# Patient Record
Sex: MALE | Race: ASIAN | NOT HISPANIC OR LATINO | URBAN - METROPOLITAN AREA
[De-identification: names, ages, dates, MRNs, and addresses within clinical notes are randomized per-mention and may not be internally consistent; named-entity substitution may affect disease eponyms.]

---

## 2023-10-25 ENCOUNTER — EMERGENCY (EMERGENCY)
Facility: HOSPITAL | Age: 33
LOS: 1 days | Discharge: HOME | End: 2023-10-25
Admitting: STUDENT IN AN ORGANIZED HEALTH CARE EDUCATION/TRAINING PROGRAM
Payer: MEDICAID

## 2023-10-25 VITALS
SYSTOLIC BLOOD PRESSURE: 134 MMHG | OXYGEN SATURATION: 99 % | RESPIRATION RATE: 19 BRPM | DIASTOLIC BLOOD PRESSURE: 80 MMHG | TEMPERATURE: 98 F | HEART RATE: 93 BPM

## 2023-10-25 VITALS — OXYGEN SATURATION: 98 % | TEMPERATURE: 99 F

## 2023-10-25 PROCEDURE — 72100 X-RAY EXAM L-S SPINE 2/3 VWS: CPT | Mod: 26

## 2023-10-25 PROCEDURE — 99284 EMERGENCY DEPT VISIT MOD MDM: CPT

## 2023-10-25 PROCEDURE — 93010 ELECTROCARDIOGRAM REPORT: CPT

## 2023-10-25 RX ORDER — ACETAMINOPHEN 500 MG
975 TABLET ORAL DAILY
Refills: 0 | Status: DISCONTINUED | OUTPATIENT
Start: 2023-10-25 | End: 2023-10-29

## 2023-10-25 RX ORDER — DIAZEPAM 5 MG
1 TABLET ORAL
Qty: 10 | Refills: 0
Start: 2023-10-25

## 2023-10-25 RX ORDER — DIAZEPAM 5 MG
5 TABLET ORAL ONCE
Refills: 0 | Status: DISCONTINUED | OUTPATIENT
Start: 2023-10-25 | End: 2023-10-25

## 2023-10-25 RX ORDER — IBUPROFEN 200 MG
1 TABLET ORAL
Qty: 20 | Refills: 0
Start: 2023-10-25

## 2023-10-25 RX ORDER — IBUPROFEN 200 MG
600 TABLET ORAL ONCE
Refills: 0 | Status: COMPLETED | OUTPATIENT
Start: 2023-10-25 | End: 2023-10-25

## 2023-10-25 RX ADMIN — Medication 600 MILLIGRAM(S): at 13:00

## 2023-10-25 RX ADMIN — Medication 975 MILLIGRAM(S): at 13:00

## 2023-10-25 RX ADMIN — Medication 5 MILLIGRAM(S): at 13:00

## 2023-10-25 NOTE — ED ADULT NURSE NOTE - OBJECTIVE STATEMENT
Pt is alert and orientedx4, ambulatory at baseline. Pt presents to the ED with lower back pain, days he was lifting boxes yesterday and now its painful. Pts says he has been dealing with back pain. Pt denies chest pain, shortness of breath, dyspnea on exertion, breathing is unlabored and even. Pt denies nausea, vomiting or diarrhea.

## 2023-10-25 NOTE — ED ADULT NURSE NOTE - NSFALLUNIVINTERV_ED_ALL_ED
Bed/Stretcher in lowest position, wheels locked, appropriate side rails in place/Call bell, personal items and telephone in reach/Instruct patient to call for assistance before getting out of bed/chair/stretcher/Non-slip footwear applied when patient is off stretcher/[place] to call system/Physically safe environment - no spills, clutter or unnecessary equipment/Purposeful proactive rounding/Room/bathroom lighting operational, light cord in reach

## 2023-10-25 NOTE — ED ADULT TRIAGE NOTE - CHIEF COMPLAINT QUOTE
Pt complaining of back pain x2 years. Pt states 2 days ago he 2was lifting something and when he bent over the pain became worse. Pt denies chest pain, sob, n/v/d, fever or chills.

## 2023-10-25 NOTE — ED PROVIDER NOTE - OBJECTIVE STATEMENT
33-year-old male history of chronic back pain presents ED complaining of low back pain worsening over the past 2 days.  Patient states 2 weeks ago slipped and fell on his back while walking down the steps.  States since has been having intermittent back pain however 2 days ago when he was trying to lift something off the ground he felt severe low back spasming.  Patient has been taking naproxen without much improvement.  Currently pain 9/10.  Did not take any pain meds today.  Pain is localized.  Patient states 2 years ago saw spine specialist and was told he has disc herniation L5-S1 patient was offered spinal injections at that time which patient deferred since pain improved.  Denies any urinary symptoms, CP, SOB, abdominal pain, numbness, weakness, bowel or bladder incontinence.

## 2023-10-25 NOTE — ED PROVIDER NOTE - PATIENT PORTAL LINK FT
You can access the FollowMyHealth Patient Portal offered by [facility] by registering at the following website: [email or web address]. By joining [facility]’s FollowMyHealth portal, you will also be able to view your health information using other applications (apps) compatible with our system.

## 2023-10-25 NOTE — ED PROVIDER NOTE - INTERNATIONAL TRAVEL
Writer attempted to call for RN to RN report. JESSICA Villar states \"I told you guys a few minutes ago that I cannot take the patient right now. I have had 4 walk ins and I am not done going through his paperwork and now I have another walk in at this moment. I will call back in a couple minutes when I am ready\".    No
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
## 2023-10-25 NOTE — ED PROVIDER NOTE - NS ED ROS FT
ROS:  GENERAL: No fever, no chills  EYES: no change in vision  HEENT: no trouble swallowing, no trouble speaking  CARDIAC: no chest pain  PULMONARY: no cough, no shortness of breath  GI: no abdominal pain, no nausea, no vomiting, no diarrhea, no constipation  : No dysuria, no frequency, no change in appearance, or odor of urine  SKIN: no rashes  NEURO: no headache, no weakness  MSK: +BACK PAIN. No joint pain

## 2023-10-26 PROBLEM — Z00.00 ENCOUNTER FOR PREVENTIVE HEALTH EXAMINATION: Status: ACTIVE | Noted: 2023-10-26

## 2023-11-01 ENCOUNTER — APPOINTMENT (OUTPATIENT)
Dept: ORTHOPEDIC SURGERY | Facility: CLINIC | Age: 33
End: 2023-11-01

## 2024-08-05 NOTE — ED PROVIDER NOTE - CLINICAL SUMMARY MEDICAL DECISION MAKING FREE TEXT BOX
"Ariana Bill is a 69 y.o. male     Subjective   This patient presents today for follow-up of his calf claudication bilaterally with ambulation.  He states that he has to stop when his calf pain gets significant.  He states that he has to rest for about 30 minutes and then is legs feel better.  He used to smoke a pack a day but now he is smoking a few cigarettes a week.  He is on aspirin and statin.  He is currently not a diabetic.  He is a retired .  He denies any fever chills nausea vomiting or headache.  He is 5 foot 8 and does weigh 207 pounds.  He currently denies any history of back surgery or persistent significant back pain.         Objective     Vitals:    08/01/24 0900   BP: 150/70   Pulse: 89   SpO2: 98%      Physical Exam  This patient is alert and oriented x 3.  He is in no acute distress.  Head is normocephalic.  Pupils are equal and reactive to light and accommodation.  Neck is soft and supple without palpable lymph nodes.  Heart is regular rate.  Lungs have some decreased breath sounds posteriorly.  Abdomen is soft with positive bowel sounds there is no pain on palpation.  Upper and lower extremities seem to have adequate range of motion at rest.  He does have palpable brachial radial femoral popliteal and pedal pulses bilaterally.  His right pedal pulse is greater than his left.  Skin turgor is adequate.  Psychologically seems to be acting appropriately  Blood pressure 150/70, pulse 89, height 1.727 m (5' 8\"), weight 93.9 kg (207 lb), SpO2 98%.            Patient Active Problem List    Diagnosis Date Noted    Coronary artery disease involving native coronary artery of native heart without angina pectoris 04/12/2024    Claudication (CMS-HCC) 04/12/2024    Right wrist pain 04/12/2024    Lesion of right cervical nerve root 04/12/2024    Carpal tunnel syndrome of right wrist 04/12/2024    Effusion of right knee 04/11/2024    Arthritis of knee 04/11/2024    Elevated coronary artery " calcium score 04/03/2024    Hypertension 01/11/2024    Hyperlipidemia 12/19/2023    GERD (gastroesophageal reflux disease) 12/19/2023    Colon polyp 12/19/2023    Smoker 12/19/2023    Subacute cough 12/19/2023    Benign prostatic hyperplasia 12/19/2023    Vitamin D deficiency 12/19/2023    Hodgkin's disease (Multi) 02/28/2005          Current Outpatient Medications:     amLODIPine (Norvasc) 10 mg tablet, Take 1 tablet (10 mg) by mouth once daily in the morning. Take before meals., Disp: , Rfl:     aspirin 81 mg EC tablet, Take 1 tablet (81 mg) by mouth once daily., Disp: , Rfl:     atorvastatin (Lipitor) 40 mg tablet, Take 1 tablet (40 mg) by mouth once daily., Disp: 90 tablet, Rfl: 3    cholecalciferol (Vitamin D-3) 50 mcg (2,000 unit) capsule, Take 1 capsule (50 mcg) by mouth once daily., Disp: , Rfl:     cyanocobalamin (Vitamin B-12) 1,000 mcg tablet, Take 1 tablet (1,000 mcg) by mouth once daily., Disp: , Rfl:     hydroCHLOROthiazide (HYDRODiuril) 25 mg tablet, Take 1 tablet (25 mg) by mouth once daily in the morning. Take before meals., Disp: , Rfl:     losartan (Cozaar) 50 mg tablet, Take 1 tablet (50 mg) by mouth once daily., Disp: , Rfl:     naproxen (Naprosyn) 500 mg tablet, Take 1 tablet (500 mg) by mouth 2 times daily (morning and late afternoon)., Disp: , Rfl:     pantoprazole (ProtoNix) 20 mg EC tablet, Take 1 tablet (20 mg) by mouth once daily., Disp: , Rfl:      Lab Results   Component Value Date    WBC 8.8 12/19/2023    HGB 16.3 12/19/2023    HCT 47.8 12/19/2023     12/19/2023    CHOL 261 (H) 12/19/2023    TRIG 265 (H) 12/19/2023    HDL 50.8 12/19/2023    ALT 37 12/19/2023    AST 25 12/19/2023     12/19/2023    K 5.1 01/17/2024     12/19/2023    CREATININE 1.07 12/19/2023    BUN 14 12/19/2023    CO2 28 12/19/2023    TSH 0.94 12/19/2023    PSA 2.5 12/19/2023    HGBA1C 5.8 (H) 12/19/2023    HGBA1C 5.8 (H) 12/19/2023       BIANCA with exercise    Result Date: 7/11/2024                  [facility] [address], [address], [place] [address]       Tel [phone] and Fax [phone]  Vascular Lab Report [facility] ANKLE BRACHIAL INDEX (BIANCA) WITH EXERCISE  Patient Name:      ZORAN GUAJARDO    [place]iana Physician: [id number] Henny Faust MD Study Date:        7/10/2024          Ordering           [id number] CINDI DELGADO                                       Physician: MRN/PID:           [id number]           Technologist:      Yudi Rowe T Accession#:        [id number]       Technologist 2: Date of Birth/Age: 1955 / 69      Encounter#:        [id number]                    years Gender:            M Admission Status:  Outpatient         Location           [facility]                                       Performed:  Diagnosis/ICD: Peripheral vascular disease, unspecified-I73.9 Indication:    Peripheral vascular disease CPT Codes:     23234.52 Peripheral artery PVR with exercise Reduced Service  CONCLUSIONS: Right Lower PVR: No evidence of arterial occlusive disease in the right lower extremity at rest and with exercise. Multiphasic flow is noted in the right dorsalis pedis artery and right posterior tibial artery. Left Lower PVR: No evidence of arterial occlusive disease in the left lower extremity at rest and with exercise. Multiphasic flow is noted in the left posterior tibial artery and left dorsalis pedis artery. Exercise:Exercise completed at 1 1/2 miles per hour for 3 minutes and 30 seconds. The exam was terminated due to extremity pain.  Imaging & Doppler Findings:  RIGHT Lower PVR                Pressures Ratios Right Posterior Tibial (Ankle) 166 mmHg  1.08 Right Dorsalis Pedis (Ankle)   166 mmHg  1.08  Right Ankle Post Exercise      194 mmHg  1.18  LEFT Lower PVR                Pressures Ratios Left Posterior Tibial (Ankle) 174 mmHg  1.13 Left Dorsalis Pedis (Ankle)   173 mmHg  1.12  Left Ankle Post Exercise       192 mmHg  1.17                    Right     Left Brachial Pressure 154 mmHg 149 mmHg   [id number] Henny Faust MD Electronically signed by [id number] Henny Faust MD on 7/11/2024 at 4:08:40 PM  ** Final **     Vascular US ankle brachial index (BIANCA) with exercise    Result Date: 7/11/2024                [facility] [address], [address], [place], [place] [address]       Tel [phone] and Fax [phone] Vascular Lab Report [facility] US ANKLE BRACHIAL INDEX (BIANCA) WITH EXERCISE Patient Name:      ZORAN GUAJARDO    Reading Physician: [id number] Henny Faust MD Study Date:        7/10/2024          Ordering           [id number] CINDI DELGADO                                       Physician: MRN/PID:           [id number]           Technologist:      Yudi Rowe HALEY Accession#:        [id number]       Technologist 2: Date of Birth/Age: 1955 / 69      Encounter#:        [id number]                    years Gender:            M Admission Status:  Outpatient         Location           [facility]                                       Performed: Diagnosis/ICD: Peripheral vascular disease, unspecified-I73.9 Indication:    Peripheral vascular disease CPT Codes:     46814.52 Peripheral artery PVR with exercise Reduced Service CONCLUSIONS: Right Lower PVR: No evidence of arterial occlusive disease in the right lower extremity at rest and with exercise. Multiphasic flow is noted in the right dorsalis pedis artery and right posterior tibial artery. Left Lower PVR: No evidence of arterial occlusive disease in the left lower extremity at rest and with exercise. Multiphasic flow is noted in the left posterior tibial artery and left dorsalis pedis artery. Exercise:Exercise completed at 1 1/2 miles per hour for 3 minutes and 30 seconds. The exam was terminated due to extremity pain. Imaging & Doppler Findings: RIGHT Lower PVR                Pressures Ratios Right  Posterior Tibial (Ankle) 166 mmHg  1.08 Right Dorsalis Pedis (Ankle)   166 mmHg  1.08 Right Ankle Post Exercise      194 mmHg  1.18 LEFT Lower PVR                Pressures Ratios Left Posterior Tibial (Ankle) 174 mmHg  1.13 Left Dorsalis Pedis (Ankle)   173 mmHg  1.12 Left Ankle Post Exercise      192 mmHg  1.17                     Right     Left Brachial Pressure 154 mmHg 149 mmHg [id number] eHnny Faust MD Electronically signed by [id number] Henny Faust MD on 7/11/2024 at 4:08:40 PM ** Final **                Assessment/Plan   Active Problems:  There are no active Hospital Problems.      This patient presents today for follow-up of his bilateral lower extremity pain with ambulation.  He states he gets calf pain on both sides and when his pain gets bad enough, he has to stop walking.  He also states it takes about 30 minutes for his legs to fully recover.  On physical exam, he does have palpable femoral popliteal and pedal pulses bilaterally.  He states that his legs affect him equally.  He denies any back surgery or constant significant back pain.  His recent noninvasive arterial studies show a normal BIANCA at rest and a normal BIANCA after exercise.  At this time, there is no evidence to support peripheral arterial disease causing his claudication.  I do feel that this is a neurological issue.  I would like to refer him to a neurologist as I do believe that this is a neurological issue causing his claudication.  This patient can follow-up with me as needed.  Thank you very much for allow me to take part in care of your patient.  Sincerely years, Dr. Kong Jansen, DO    33-year-old male history of chronic back pain presents ED complaining of low back pain worsening over the past 2 days.  Patient states 2 weeks ago slipped and fell on his back while walking down the steps.  States since has been having intermittent back pain however 2 days ago when he was trying to lift something off the ground he felt severe low back spasming.  Patient has been taking naproxen without much improvement.  Currently pain 9/10.  Did not take any pain meds today.  Pain is localized.  Patient states 2 years ago saw spine specialist and was told he has disc herniation L5-S1 patient was offered spinal injections at that time which patient deferred since pain improved.  Denies any urinary symptoms, CP, SOB, abdominal pain, numbness, weakness, bowel or bladder incontinence.    On exam neuro intact, pt ambulatory. +midline TTP over lumbar spine.  Given trauma will order xrays to r/o fracture, pain meds.  likely dc with ortho spine follow up.